# Patient Record
Sex: FEMALE | Race: WHITE
[De-identification: names, ages, dates, MRNs, and addresses within clinical notes are randomized per-mention and may not be internally consistent; named-entity substitution may affect disease eponyms.]

---

## 2018-06-19 ENCOUNTER — HOSPITAL ENCOUNTER (OUTPATIENT)
Dept: HOSPITAL 47 - WWCWWP | Age: 48
Discharge: HOME | End: 2018-06-19
Payer: COMMERCIAL

## 2018-06-19 VITALS — RESPIRATION RATE: 18 BRPM

## 2018-06-19 VITALS — BODY MASS INDEX: 39.8 KG/M2

## 2018-06-19 DIAGNOSIS — Z12.31: Primary | ICD-10-CM

## 2018-06-19 PROCEDURE — 77067 SCR MAMMO BI INCL CAD: CPT

## 2018-06-19 NOTE — P.HPOB
History of Present Illness


H&P Date: 18


Chief Complaint: The patient is here for her routine gynecologic exam and 

mammogram.





This is a 47-year-old  with an LMP of 2018.  The patient is here 

to establish with this office.  She is without gynecologic complaints.  She is 

currently not sexually active.  Menses are regularly every month.  Her last 

pelvic exam was 3 or 4 years ago.





Review of Systems





She states she has lost about 20 pounds over the last year with dietary 

changes.  She denies respiratory, cardiac, or G.I. problems.





Past Medical History


Past Medical History: No Reported History


Additional Past Medical History / Comment(s): Past GYN history: she believes 

she may have had a in STD as a teenager which was treated.  She does not know 

the name of the infection that she had.  She has no other history of STDs.


History of Any Multi-Drug Resistant Organisms: None Reported


Past Surgical History: No Surgical Hx Reported


Past Anesthesia/Blood Transfusion Reactions: No Reported Reaction


Past Psychological History: No Psychological Hx Reported


Smoking Status: Never smoker


Past Alcohol Use History: Occasional (2 per month)


Past Drug Use History: None Reported


Additional History: She has been a  since .  She is not seeing anybody 

at this time.  She is the  at [employer].





- Past Family History


  ** Mother


Family Medical History: Hypertension


Additional Family Medical History / Comment(s): Mother  of ALS.





  ** Father


Additional Family Medical History / Comment(s): Father has macular degeneration.





Medications and Allergies


 Home Medications











 Medication  Instructions  Recorded  Confirmed  Type


 


No Known Home Medications [No  18 History





Known Home Medications]    











 Allergies











Allergy/AdvReac Type Severity Reaction Status Date / Time


 


No Known Allergies Allergy   Verified 18 08:50














Exam





- Vital Signs


Vital signs: 


 Vital Signs











  Resp


 


 18 08:52  18








 Intake and Output











 18





 22:59 06:59 14:59


 


Other:   


 


  Weight   102.058 kg











Height 5'3", BMI 39.9.





This is a well-developed well-nourished heavyset white female who is alert and 

oriented times 3 in no acute distress.


HEENT: Within normal limits.


NECK: Supple without mass or thyromegaly.


CHEST AND LUNGS: Clear to auscultation.


HEART: Regular rate and rhythm.


BREASTS: Are without mass or discharge.


AXILLARY EXAM: Negative for adenopathy.


BACK: Negative for CVA tenderness.


ABDOMEN: Soft, nontender, without palpable masses.


PELVIC EXAM: Normal external genitalia. Cervix and vagina appear normal. There 

is no unusual discharge.  There is no evidence of prolapse.  The uterus is 

midposition, nongravid size and nontender. There are no palpable adnexal masses 

or tenderness.


RECTAL EXAM: negative for mass or tenderness and is negative for occult blood.


EXTREMITIES: Nontender.





IMPRESSION: 


1.  47-year-old female with normal gynecologic exam.





PLAN: 


1.  Pap smear was performed.


2.  Self breast awareness was discussed.


3.  Screening mammogram will be done today.


4.  Osteoporosis prevention was discussed.


5.  She will return in one year.

## 2018-06-21 NOTE — MM
Reason for exam: screening  (asymptomatic).

Last mammogram was performed 1 year and 9 months ago.



Physical Findings:

A clinical breast exam by your physician is recommended on an annual basis and 

results should be correlated with mammographic findings.



MG Screening Mammo w CAD

Bilateral CC and MLO view(s) were taken.

Prior study comparison: September 19, 2016, bilateral MG screening mammo w CAD.  

September 21, 2015, bilateral MG screening mammo w CAD.

The breast tissue is heterogeneously dense. This may lower the sensitivity of 

mammography.  No significant changes when compared with prior studies.





ASSESSMENT: Negative, BI-RAD 1



RECOMMENDATION:

Routine screening mammogram of both breasts in 1 year.

## 2018-12-13 ENCOUNTER — HOSPITAL ENCOUNTER (EMERGENCY)
Dept: HOSPITAL 47 - EC | Age: 48
LOS: 1 days | Discharge: HOME | End: 2018-12-14
Payer: COMMERCIAL

## 2018-12-13 DIAGNOSIS — R20.0: ICD-10-CM

## 2018-12-13 DIAGNOSIS — R42: ICD-10-CM

## 2018-12-13 DIAGNOSIS — M79.601: ICD-10-CM

## 2018-12-13 DIAGNOSIS — R07.89: Primary | ICD-10-CM

## 2018-12-13 PROCEDURE — 36415 COLL VENOUS BLD VENIPUNCTURE: CPT

## 2018-12-13 PROCEDURE — 80053 COMPREHEN METABOLIC PANEL: CPT

## 2018-12-13 PROCEDURE — 82550 ASSAY OF CK (CPK): CPT

## 2018-12-13 PROCEDURE — 82553 CREATINE MB FRACTION: CPT

## 2018-12-13 PROCEDURE — 99284 EMERGENCY DEPT VISIT MOD MDM: CPT

## 2018-12-13 PROCEDURE — 85025 COMPLETE CBC W/AUTO DIFF WBC: CPT

## 2018-12-13 PROCEDURE — 84100 ASSAY OF PHOSPHORUS: CPT

## 2018-12-13 PROCEDURE — 71275 CT ANGIOGRAPHY CHEST: CPT

## 2018-12-13 PROCEDURE — 85379 FIBRIN DEGRADATION QUANT: CPT

## 2018-12-13 PROCEDURE — 83690 ASSAY OF LIPASE: CPT

## 2018-12-13 PROCEDURE — 84484 ASSAY OF TROPONIN QUANT: CPT

## 2018-12-13 PROCEDURE — 83735 ASSAY OF MAGNESIUM: CPT

## 2018-12-13 PROCEDURE — 71046 X-RAY EXAM CHEST 2 VIEWS: CPT

## 2018-12-13 PROCEDURE — 73030 X-RAY EXAM OF SHOULDER: CPT

## 2018-12-14 LAB
ALBUMIN SERPL-MCNC: 4.3 G/DL (ref 3.5–5)
ALP SERPL-CCNC: 151 U/L (ref 38–126)
ALT SERPL-CCNC: 21 U/L (ref 9–52)
ANION GAP SERPL CALC-SCNC: 11 MMOL/L
AST SERPL-CCNC: 21 U/L (ref 14–36)
BASOPHILS # BLD AUTO: 0 K/UL (ref 0–0.2)
BASOPHILS NFR BLD AUTO: 0 %
BUN SERPL-SCNC: 18 MG/DL (ref 7–17)
CALCIUM SPEC-MCNC: 9 MG/DL (ref 8.4–10.2)
CHLORIDE SERPL-SCNC: 104 MMOL/L (ref 98–107)
CK SERPL-CCNC: 123 U/L (ref 30–135)
CO2 SERPL-SCNC: 25 MMOL/L (ref 22–30)
EOSINOPHIL # BLD AUTO: 0.3 K/UL (ref 0–0.7)
EOSINOPHIL NFR BLD AUTO: 3 %
ERYTHROCYTE [DISTWIDTH] IN BLOOD BY AUTOMATED COUNT: 4.58 M/UL (ref 3.8–5.4)
ERYTHROCYTE [DISTWIDTH] IN BLOOD: 15.4 % (ref 11.5–15.5)
GLUCOSE SERPL-MCNC: 105 MG/DL (ref 74–99)
HCT VFR BLD AUTO: 31.8 % (ref 34–46)
HGB BLD-MCNC: 9.6 GM/DL (ref 11.4–16)
LIPASE SERPL-CCNC: 125 U/L (ref 23–300)
LYMPHOCYTES # SPEC AUTO: 2.5 K/UL (ref 1–4.8)
LYMPHOCYTES NFR SPEC AUTO: 28 %
MAGNESIUM SPEC-SCNC: 2.1 MG/DL (ref 1.6–2.3)
MCH RBC QN AUTO: 21.1 PG (ref 25–35)
MCHC RBC AUTO-ENTMCNC: 30.3 G/DL (ref 31–37)
MCV RBC AUTO: 69.5 FL (ref 80–100)
MONOCYTES # BLD AUTO: 0.4 K/UL (ref 0–1)
MONOCYTES NFR BLD AUTO: 4 %
NEUTROPHILS # BLD AUTO: 5.6 K/UL (ref 1.3–7.7)
NEUTROPHILS NFR BLD AUTO: 63 %
PLATELET # BLD AUTO: 331 K/UL (ref 150–450)
POTASSIUM SERPL-SCNC: 4.1 MMOL/L (ref 3.5–5.1)
PROT SERPL-MCNC: 7.5 G/DL (ref 6.3–8.2)
SODIUM SERPL-SCNC: 140 MMOL/L (ref 137–145)
TROPONIN I SERPL-MCNC: <0.012 NG/ML (ref 0–0.03)
WBC # BLD AUTO: 8.9 K/UL (ref 3.8–10.6)

## 2018-12-14 NOTE — XR
EXAM:

  XR Chest, 2 Views

 

CLINICAL HISTORY:

  right sided rib/shoulder pain, no known injury, no prior

 

TECHNIQUE:

  Frontal and lateral views of the chest.

 

COMPARISON:

  No relevant prior studies available.

 

FINDINGS:

  Lungs:  Unremarkable.  No consolidation.

  Pleural space:  Unremarkable.  No pneumothorax.

  Heart:  Unremarkable.  No cardiomegaly.

  Mediastinum:  Unremarkable.

  Bones/joints:  Unremarkable.

 

IMPRESSION:     

  Normal chest x-rays.

 

_______________________________________________

 

EXAM:

  XR Right Shoulder Complete, 2 or More Views

 

CLINICAL HISTORY:

  right sided rib/shoulder pain, no known injury, no prior

 

TECHNIQUE:

  Two or more views of the right shoulder.

 

COMPARISON:

  No relevant prior studies available.

 

FINDINGS:

  Bones/joints:  Unremarkable.  No acute fracture.  No dislocation.

  Soft tissues:  Unremarkable.

 

IMPRESSION:     

  Normal right shoulder x-rays.

## 2018-12-14 NOTE — XR
EXAM:

  XR Right Shoulder Complete, 2 or More Views

 

CLINICAL HISTORY:

  ITS.REASON XR Reason: pain

 

TECHNIQUE:

  Two or more views of the right shoulder.

 

COMPARISON:

  No relevant prior studies available.

 

FINDINGS:

  Bones/joints:  Unremarkable.  No acute fracture.  No dislocation.

  Soft tissues:  Unremarkable.

 

IMPRESSION:     

  No acute bony abnormality.

## 2018-12-14 NOTE — CT
EXAM:

  CT Angiography Chest With Intravenous Contrast

 

CLINICAL HISTORY:

  r/o PE

 

TECHNIQUE:

  Axial computed tomographic angiography images of the chest with 

intravenous contrast using pulmonary embolism protocol.  CTDI is 11.6 mGy 

and DLP is 454.8 mGy-cm.  This CT exam was performed using one or more of 

the following dose reduction techniques: automated exposure control, 

adjustment of the mA and/or kV according to patient size, and/or use of 

iterative reconstruction technique.

  MIP reconstructed images were created and reviewed.

 

COMPARISON:

  No relevant prior studies available.

 

FINDINGS:

  Pulmonary arteries: Patient was scanned twice due to a malfunction with 

the IV tubing during the first scan.  This results in two partial bolus 

scans.  No clear PE on this technically limited assessment.

  Aorta:  No acute findings.  No thoracic aortic aneurysm.

  Lungs:  Unremarkable.  No mass.  No consolidation.

  Pleural space:  Unremarkable.  No significant effusion.  No 

pneumothorax.

  Heart:  No significant pericardial effusion.  

  Bones/joints:  No acute fracture.  No dislocation.

  Soft tissues: Small hiatal hernia.  Left lobe hepatic cyst..

  Lymph nodes:  Unremarkable.  No enlarged lymph nodes.

 

IMPRESSION:     

No clear PE on technically limited exam.

 

Lungs are clear.

## 2020-07-21 ENCOUNTER — HOSPITAL ENCOUNTER (OUTPATIENT)
Dept: HOSPITAL 47 - RADMAMWWP | Age: 50
Discharge: HOME | End: 2020-07-21
Attending: FAMILY MEDICINE
Payer: COMMERCIAL

## 2020-07-21 DIAGNOSIS — R92.8: ICD-10-CM

## 2020-07-21 DIAGNOSIS — N63.0: Primary | ICD-10-CM

## 2020-07-21 PROCEDURE — 77066 DX MAMMO INCL CAD BI: CPT

## 2020-07-21 NOTE — MM
Reason for exam: clinical finding.

Last mammogram was performed 2 years and 1 month ago.



Physical Findings:

Nurse did not find any significant physical abnormalities on exam.



MG Diagnostic Mammo w CAD [facility]

Bilateral CC and MLO view(s) were taken.  Spot compression CC, ML, CCRM, and CCRL 

view(s) were taken of the left breast.

Prior study comparison: June 19, 2018, bilateral MG screening mammo w CAD.  

September 19, 2016, bilateral MG screening mammo w CAD.

The breast tissue is heterogeneously dense. This may lower the sensitivity of 

mammography.  The central lateral asymmetric density on the left does not persist 

on additional views.

No significant new findings when compared with previous films.



These results were verbally communicated with the patient and result sheet given 

to the patient on 7/21/20.





ASSESSMENT: Incomplete: need additional imaging evaluation, BI-RAD 0



RECOMMENDATION:

Ultrasound of both breasts.

(for patient bilateral palpables)

## 2020-07-21 NOTE — USB
Reason for exam: additional evaluation requested from abnormal screening.



US Breast BILAT

Right complete breast ultrasound includes all four quadrants, the retroareolar 

region and axilla. Finding demonstrates no cystic or solid lesion seen.



Left complete breast ultrasound includes all four quadrants, the retroareolar 

region and axilla. Finding demonstrates a 0.6 x 0.5 x 0.5cm oval, cystic, benign 

lesion at 1 o'clock and a 0.7 x 0.4 x 0.6cm oval, cystic, benign lesion at 4 

o'clock.



No other solid or cystic lesion on either side.



These results were verbally communicated with the patient and result sheet given 

to the patient on 7/21/20.





ASSESSMENT: Benign, BI-RAD 2



RECOMMENDATION:

Routine screening mammogram of both breasts in 1 year.

Manage on a clinical basis with regard to any suspicious palpables.

## 2020-10-28 ENCOUNTER — HOSPITAL ENCOUNTER (OUTPATIENT)
Dept: HOSPITAL 47 - RADMAMWWP | Age: 50
Discharge: HOME | End: 2020-10-28
Attending: FAMILY MEDICINE
Payer: COMMERCIAL

## 2020-10-28 DIAGNOSIS — N63.22: Primary | ICD-10-CM

## 2020-10-28 PROCEDURE — 77066 DX MAMMO INCL CAD BI: CPT

## 2020-10-28 PROCEDURE — 77062 BREAST TOMOSYNTHESIS BI: CPT

## 2020-10-28 PROCEDURE — 76641 ULTRASOUND BREAST COMPLETE: CPT

## 2020-10-28 NOTE — MM
Reason for exam: clinical finding.

Last mammogram was performed 3 months ago.



Physical Findings:

Nurse Summary: 1 x 1.5cm nodule in the left breast at 12 o'clock and a 0.5 x 0.5cm

nodule in the left breast at 1 o'clock (nurse ts).



MG 3D Diag Mammo W/Cad [facility]IS

Bilateral CC and MLO view(s) were taken.

Prior study comparison: July 21, 2020, bilateral MG diagnostic mammo w CAD [facility].  

June 19, 2018, bilateral MG screening mammo w CAD.

There is chronic nodularity in the left breast. There is no discrete abnormality 

including area of concern at BB's x 2.

No significant new findings when compared with previous films.



These results were verbally communicated with the patient and result sheet given 

to the patient on 10/28/20.





ASSESSMENT: Benign, BI-RAD 2



RECOMMENDATION:

Ultrasound of the left breast in 6 months.

Manage patient on a clinical basis.

Routine screening mammogram of both breasts in 1 year.

## 2020-10-28 NOTE — USB
Reason for exam: clinical finding.

Indicated problem(s): palpable abnormality in the left breast.



US Breast LT

Left complete breast ultrasound includes all four quadrants, the retroareolar 

region and axilla. Finding demonstrates a 2.8 x 0.9 x 2.5cm oval, hyperechoic 

lipoma at 12 o'clock BB, corresponds to palpable, a 0.8 x 0.4 x 0.6cm cystic 

lesion at 1 o'clock, possibly second BB site and a 0.6 x 0.4 x 0.5cm cystic lesion

at 4 o'clock.



These results were verbally communicated with the patient and result sheet given 

to the patient on 10/28/20.





ASSESSMENT: Probably benign, BI-RAD 3



RECOMMENDATION:

Ultrasound of the left breast in 6 months.

## 2021-06-17 ENCOUNTER — HOSPITAL ENCOUNTER (OUTPATIENT)
Dept: HOSPITAL 47 - RADUSWWP | Age: 51
Discharge: HOME | End: 2021-06-17
Attending: FAMILY MEDICINE
Payer: COMMERCIAL

## 2021-06-17 DIAGNOSIS — N64.59: Primary | ICD-10-CM

## 2021-06-18 NOTE — USB
Reason for exam: follow-up at short interval from prior study.



History:

Took hormonal contraceptives for 7 years beginning at age 22.



Physical Findings:

Nurse did not find any significant physical abnormalities on exam.



US Breast Limited LT

Left limited breast ultrasound including focal area of concern, retroareolar and 

axilla demonstrates a 0.8 x 0.7 x 0.6cm oval, cystic lesion at 1 o'clock, a 0.4 x 

0.8 x 0.3cm oval, cystic lesion at 2 o'clock, a 0.4 x 0.4 x 0.2cm mixed lesion at 

6 o'clock and a 0.3 x 0.3 x 0.2cm oval, cystic lesion at 6 o'clock.



These results were verbally communicated with the patient and result sheet given 

to the patient on 6/17/21.





ASSESSMENT: Benign, BI-RAD 2



RECOMMENDATION:

Routine screening mammogram of both breasts in 4 months.

Back on schedule for October 2021.